# Patient Record
Sex: FEMALE | NOT HISPANIC OR LATINO | Employment: UNEMPLOYED | URBAN - METROPOLITAN AREA
[De-identification: names, ages, dates, MRNs, and addresses within clinical notes are randomized per-mention and may not be internally consistent; named-entity substitution may affect disease eponyms.]

---

## 2019-10-28 ENCOUNTER — HOSPITAL ENCOUNTER (OUTPATIENT)
Dept: GENERAL RADIOLOGY | Facility: HOSPITAL | Age: 24
Discharge: HOME | End: 2019-10-28
Attending: OBSTETRICS & GYNECOLOGY

## 2020-02-26 ENCOUNTER — HOSPITAL ENCOUNTER (OUTPATIENT)
Dept: DIABETES SERVICES | Facility: HOSPITAL | Age: 25
Discharge: HOME | End: 2020-02-26
Attending: OBSTETRICS & GYNECOLOGY

## 2021-09-15 ENCOUNTER — ROUTINE PRENATAL (OUTPATIENT)
Dept: OBSTETRICS AND GYNECOLOGY | Facility: CLINIC | Age: 26
End: 2021-09-15

## 2021-09-15 VITALS — DIASTOLIC BLOOD PRESSURE: 63 MMHG | WEIGHT: 121 LBS | SYSTOLIC BLOOD PRESSURE: 100 MMHG

## 2021-09-15 DIAGNOSIS — Z34.80 SUPERVISION OF OTHER NORMAL PREGNANCY, ANTEPARTUM: Primary | ICD-10-CM

## 2021-09-15 LAB
GLUCOSE UR STRIP-MCNC: NEGATIVE MG/DL
PROT UR STRIP-MCNC: NEGATIVE MG/DL

## 2021-09-15 PROCEDURE — 0502F SUBSEQUENT PRENATAL CARE: CPT | Performed by: ADVANCED PRACTICE MIDWIFE

## 2021-09-15 RX ORDER — ALBUTEROL SULFATE 90 UG/1
2 AEROSOL, METERED RESPIRATORY (INHALATION) EVERY 4 HOURS PRN
COMMUNITY

## 2021-09-15 NOTE — PROGRESS NOTES
OB FOLLOW UP  CC- Here for care of pregnancy        Irma Cruz is a 26 y.o.  21w4d patient being seen today for her obstetrical follow up visit. Patient reports no complaints.     Anatomy U/S today: EFW 93%, all anatomy visualized WNL    Her prenatal care is complicated by (and status) :    There is no problem list on file for this patient.      ROS -   Patient Reports : No Problems  Patient Denies: Loss of Fluid, Vaginal Spotting and Contractions  Fetal Movement : normal  All other systems reviewed and are negative.       The additional following portions of the patient's history were reviewed and updated as appropriate: allergies, current medications, past family history, past medical history, past social history, past surgical history and problem list.    I have reviewed and agree with the HPI, ROS, and historical information as entered above. Jyothi Celis CNM    /63   Wt 54.9 kg (121 lb)   LMP 2021 (Exact Date)       EXAM:     FHT: 155 BPM   Uterine Size: size greater than dates  Pelvic Exam: No    Urine glucose/protein: See prenatal flowsheet       Assessment and Plan    Problem List Items Addressed This Visit     None      Visit Diagnoses     Supervision of other normal pregnancy, antepartum    -  Primary    Relevant Orders    POC Urinalysis Dipstick (Completed)          1. Pregnancy at 21w4d  2. Fetal status reassuring.   3. Patient relocating to [place], plans 4 week f/u there.    Jyothi Celis CNM  09/15/2021